# Patient Record
Sex: FEMALE | Race: WHITE | NOT HISPANIC OR LATINO | URBAN - METROPOLITAN AREA
[De-identification: names, ages, dates, MRNs, and addresses within clinical notes are randomized per-mention and may not be internally consistent; named-entity substitution may affect disease eponyms.]

---

## 2017-12-29 ENCOUNTER — IMPORTED ENCOUNTER (OUTPATIENT)
Dept: URBAN - METROPOLITAN AREA CLINIC 38 | Facility: CLINIC | Age: 67
End: 2017-12-29

## 2017-12-29 PROBLEM — H53.001 UNSPECIFIED AMBLYOPIA, RIGHT EYE: Noted: 2017-12-29

## 2017-12-29 PROBLEM — H02.403 UNSPECIFIED PTOSIS OF BILATERAL EYELIDS: Noted: 2017-12-29

## 2017-12-29 PROBLEM — H25.813 COMBINED FORMS OF AGE-RELATED CATARACT, BILATERAL: Noted: 2017-12-29

## 2017-12-29 PROCEDURE — 92014 COMPRE OPH EXAM EST PT 1/>: CPT

## 2017-12-29 PROCEDURE — 92015 DETERMINE REFRACTIVE STATE: CPT

## 2018-08-03 ENCOUNTER — IMPORTED ENCOUNTER (OUTPATIENT)
Dept: URBAN - METROPOLITAN AREA CLINIC 38 | Facility: CLINIC | Age: 68
End: 2018-08-03

## 2018-08-03 PROBLEM — H25.813 COMBINED FORMS OF AGE-RELATED CATARACT, BILATERAL: Noted: 2018-08-03

## 2018-08-03 PROBLEM — H53.001 UNSPECIFIED AMBLYOPIA, RIGHT EYE: Noted: 2018-08-03

## 2018-08-03 PROCEDURE — 92014 COMPRE OPH EXAM EST PT 1/>: CPT

## 2018-08-03 PROCEDURE — 92015 DETERMINE REFRACTIVE STATE: CPT

## 2018-09-05 ENCOUNTER — IMPORTED ENCOUNTER (OUTPATIENT)
Dept: URBAN - METROPOLITAN AREA CLINIC 38 | Facility: CLINIC | Age: 68
End: 2018-09-05

## 2018-09-05 PROBLEM — H52.02 HYPEROPIA -   LEFT EYE: Noted: 2018-09-05

## 2019-08-07 ENCOUNTER — IMPORTED ENCOUNTER (OUTPATIENT)
Dept: URBAN - METROPOLITAN AREA CLINIC 38 | Facility: CLINIC | Age: 69
End: 2019-08-07

## 2019-08-07 PROBLEM — H53.001 UNSPECIFIED AMBLYOPIA, RIGHT EYE: Noted: 2019-08-07

## 2019-08-07 PROBLEM — H25.813 COMBINED FORMS OF AGE-RELATED CATARACT, BILATERAL: Noted: 2019-08-07

## 2019-08-07 PROCEDURE — 92014 COMPRE OPH EXAM EST PT 1/>: CPT

## 2019-09-20 ENCOUNTER — IMPORTED ENCOUNTER (OUTPATIENT)
Dept: URBAN - METROPOLITAN AREA CLINIC 38 | Facility: CLINIC | Age: 69
End: 2019-09-20

## 2019-09-20 PROBLEM — H25.813 COMBINED FORMS OF AGE-RELATED CATARACT, BILATERAL: Noted: 2019-09-20

## 2019-09-20 PROCEDURE — 92012 INTRM OPH EXAM EST PATIENT: CPT

## 2019-09-20 PROCEDURE — 92136 OPHTHALMIC BIOMETRY: CPT

## 2019-11-07 ENCOUNTER — IMPORTED ENCOUNTER (OUTPATIENT)
Dept: URBAN - METROPOLITAN AREA CLINIC 38 | Facility: CLINIC | Age: 69
End: 2019-11-07

## 2019-11-07 PROCEDURE — 66984 XCAPSL CTRC RMVL W/O ECP: CPT

## 2019-11-08 ENCOUNTER — IMPORTED ENCOUNTER (OUTPATIENT)
Dept: URBAN - METROPOLITAN AREA CLINIC 38 | Facility: CLINIC | Age: 69
End: 2019-11-08

## 2019-11-18 ENCOUNTER — IMPORTED ENCOUNTER (OUTPATIENT)
Dept: URBAN - METROPOLITAN AREA CLINIC 38 | Facility: CLINIC | Age: 69
End: 2019-11-18

## 2020-03-06 ENCOUNTER — IMPORTED ENCOUNTER (OUTPATIENT)
Dept: URBAN - METROPOLITAN AREA CLINIC 38 | Facility: CLINIC | Age: 70
End: 2020-03-06

## 2020-03-06 PROBLEM — H26.492 POSTERIOR CAPSULE OPACITY -   OS: Noted: 2020-03-06

## 2020-03-06 PROCEDURE — 92012 INTRM OPH EXAM EST PATIENT: CPT

## 2021-03-12 ENCOUNTER — IMPORTED ENCOUNTER (OUTPATIENT)
Dept: URBAN - METROPOLITAN AREA CLINIC 38 | Facility: CLINIC | Age: 71
End: 2021-03-12

## 2021-03-12 PROBLEM — H26.492 POSTERIOR CAPSULE OPACITY -   OS: Noted: 2021-03-12

## 2021-03-12 PROBLEM — H25.811 COMBINED FORMS OF AGE-RELATED CATARACT, RIGHT EYE: Noted: 2021-03-12

## 2021-03-12 PROCEDURE — 92014 COMPRE OPH EXAM EST PT 1/>: CPT

## 2022-03-16 ENCOUNTER — IMPORTED ENCOUNTER (OUTPATIENT)
Dept: URBAN - METROPOLITAN AREA CLINIC 38 | Facility: CLINIC | Age: 72
End: 2022-03-16

## 2022-03-16 PROBLEM — H35.372 PUCKERING OF MACULA, LEFT EYE: Noted: 2022-03-16

## 2022-03-16 PROBLEM — H25.811 COMBINED FORMS OF AGE-RELATED CATARACT, RIGHT EYE: Noted: 2022-03-16

## 2022-03-16 PROBLEM — H26.492 POSTERIOR CAPSULE OPACITY -   OS: Noted: 2022-03-16

## 2022-03-16 PROCEDURE — 92014 COMPRE OPH EXAM EST PT 1/>: CPT

## 2022-04-12 ENCOUNTER — IMPORTED ENCOUNTER (OUTPATIENT)
Dept: URBAN - METROPOLITAN AREA CLINIC 38 | Facility: CLINIC | Age: 72
End: 2022-04-12

## 2022-04-12 PROBLEM — H53.001 UNSPECIFIED AMBLYOPIA, RIGHT EYE: Noted: 2022-04-12

## 2022-04-12 PROBLEM — H35.372 PUCKERING OF MACULA, LEFT EYE: Noted: 2022-04-12

## 2022-04-12 PROCEDURE — 92014 COMPRE OPH EXAM EST PT 1/>: CPT

## 2022-04-12 PROCEDURE — 92202 OPSCPY EXTND ON/MAC DRAW: CPT

## 2022-06-18 ASSESSMENT — KERATOMETRY
OD_AXISANGLE_DEGREES: 34
OS_K1POWER_DIOPTERS: 44.50
OD_AXISANGLE_DEGREES: 20
OS_AXISANGLE_DEGREES: 30
OS_K1POWER_DIOPTERS: 44.50
OD_K2POWER_DIOPTERS: 46.00
OD_K2POWER_DIOPTERS: 46.00
OD_K1POWER_DIOPTERS: 44.50
OS_AXISANGLE_DEGREES: 145
OS_AXISANGLE_DEGREES: 147
OD_K2POWER_DIOPTERS: 45.00
OD_AXISANGLE2_DEGREES: 100
OS_AXISANGLE2_DEGREES: 80
OS_AXISANGLE_DEGREES: 159
OS_K2POWER_DIOPTERS: 45.25
OD_K1POWER_DIOPTERS: 44.25
OD_K2POWER_DIOPTERS: 45.00
OD_AXISANGLE_DEGREES: 30
OD_K1POWER_DIOPTERS: 44.50
OS_AXISANGLE2_DEGREES: 120
OD_K2POWER_DIOPTERS: 45.00
OD_K1POWER_DIOPTERS: 44.00
OS_K2POWER_DIOPTERS: 45.50
OD_K1POWER_DIOPTERS: 44.25
OS_K1POWER_DIOPTERS: 44.75
OS_K2POWER_DIOPTERS: 45.50
OD_AXISANGLE2_DEGREES: 120
OS_AXISANGLE2_DEGREES: 57
OD_AXISANGLE_DEGREES: 25
OS_K1POWER_DIOPTERS: 44.50
OS_K1POWER_DIOPTERS: 44.75
OS_K2POWER_DIOPTERS: 45.00
OS_AXISANGLE2_DEGREES: 65
OD_AXISANGLE2_DEGREES: 115
OD_AXISANGLE2_DEGREES: 110
OS_AXISANGLE2_DEGREES: 70
OS_K1POWER_DIOPTERS: 44.75
OS_AXISANGLE_DEGREES: 160
OD_K1POWER_DIOPTERS: 44.25
OS_AXISANGLE_DEGREES: 150
OS_AXISANGLE2_DEGREES: 55
OD_AXISANGLE_DEGREES: 20
OD_K2POWER_DIOPTERS: 45.50
OD_K1POWER_DIOPTERS: 44.25
OD_AXISANGLE2_DEGREES: 110
OD_K2POWER_DIOPTERS: 45.50
OS_K2POWER_DIOPTERS: 45.00
OS_AXISANGLE_DEGREES: 160
OS_K2POWER_DIOPTERS: 45.25
OD_AXISANGLE_DEGREES: 25
OD_AXISANGLE2_DEGREES: 124
OD_AXISANGLE_DEGREES: 30
OS_K2POWER_DIOPTERS: 45.00
OS_K1POWER_DIOPTERS: 44.75
OD_AXISANGLE_DEGREES: 10
OD_K1POWER_DIOPTERS: 44.25
OS_K1POWER_DIOPTERS: 45.00
OS_AXISANGLE_DEGREES: 150
OD_AXISANGLE_DEGREES: 20
OD_K2POWER_DIOPTERS: 45.25
OD_AXISANGLE2_DEGREES: 120
OS_AXISANGLE_DEGREES: 170
OD_AXISANGLE2_DEGREES: 115
OS_K2POWER_DIOPTERS: 45.25
OS_AXISANGLE2_DEGREES: 60
OD_AXISANGLE_DEGREES: 25
OS_AXISANGLE2_DEGREES: 70
OS_K2POWER_DIOPTERS: 45.00
OD_K2POWER_DIOPTERS: 45.50
OS_K1POWER_DIOPTERS: 44.50
OS_AXISANGLE_DEGREES: 155
OS_K1POWER_DIOPTERS: 44.50
OD_K2POWER_DIOPTERS: 45.25
OD_K1POWER_DIOPTERS: 44.25
OS_AXISANGLE2_DEGREES: 60
OD_AXISANGLE2_DEGREES: 110
OS_AXISANGLE2_DEGREES: 69
OD_K1POWER_DIOPTERS: 44.00
OD_AXISANGLE2_DEGREES: 115
OS_K2POWER_DIOPTERS: 44.25

## 2022-06-18 ASSESSMENT — VISUAL ACUITY
OD_CC: <J7
OS_CC: 20/25-1
OS_CC: J2
OD_CC: <J7
OS_CC: J1
OS_BAT: 20/30-
OS_CC: J1
OS_BAT: 20/40-
OS_CC: 20/50-1
OD_CC: <J7
OS_CC: 20/30+2
OD_CC: 20/400
OD_CC: 20/400
OS_CC: 20/30+1
OD_BAT: <20/400
OD_BAT: <20/400
OS_SC: 20/25+1
OD_CC: CF5
OD_CC: 20/200-1
OD_CC: 20/400
OS_SC: 20/25-1
OD_CC: <J7
OS_BAT: 20/30-
OD_BAT: <20/400
OS_CC: J2
OD_BAT: <20/400
OS_CC: J3
OD_SC: 20/400
OS_BAT: 20/40-
OS_CC: 20/25-1
OS_CC: J1
OD_CC: <J7
OD_CC: <J7
OS_CC: J2
OD_CC: 20/400
OS_CC: 20/30-1
OD_CC: 20/70
OS_CC: 20/25-1
OS_CC: 20/25-1
OD_CC: CF5
OD_CC: <J7
OS_CC: 20/20
OS_CC: J3
OD_CC: <J7
OD_BAT: <20/400
OS_BAT: 20/30-
OD_CC: CF4
OS_BAT: <20/400

## 2022-06-18 ASSESSMENT — TONOMETRY
OD_IOP_MMHG: 9
OD_IOP_MMHG: 12
OS_IOP_MMHG: 10
OD_IOP_MMHG: 11
OD_IOP_MMHG: 12
OS_IOP_MMHG: 9
OS_IOP_MMHG: 12
OS_IOP_MMHG: 11
OD_IOP_MMHG: 10
OD_IOP_MMHG: 11
OS_IOP_MMHG: 10
OS_IOP_MMHG: 9
OD_IOP_MMHG: 12
OS_IOP_MMHG: 10
OD_IOP_MMHG: 10
OS_IOP_MMHG: 10
OS_IOP_MMHG: 8
OS_IOP_MMHG: 10

## 2022-10-12 ENCOUNTER — ESTABLISHED COMPREHENSIVE EXAM (OUTPATIENT)
Dept: URBAN - METROPOLITAN AREA CLINIC 84 | Facility: CLINIC | Age: 72
End: 2022-10-12

## 2022-10-12 DIAGNOSIS — H35.372: ICD-10-CM

## 2022-10-12 PROCEDURE — 92134 CPTRZ OPH DX IMG PST SGM RTA: CPT

## 2022-10-12 PROCEDURE — 92012 INTRM OPH EXAM EST PATIENT: CPT

## 2022-10-12 ASSESSMENT — VISUAL ACUITY
OD_GLARE: <20/400
OS_PH: 20/30
OD_CC: <J7
OD_CC: 20/400+1
OD_PH: 20/200
OS_CC: 20/40-1
OS_GLARE: 20/50

## 2022-10-12 ASSESSMENT — TONOMETRY
OS_IOP_MMHG: 9
OD_IOP_MMHG: 8

## 2022-10-20 ENCOUNTER — FOLLOW UP (OUTPATIENT)
Dept: URBAN - METROPOLITAN AREA CLINIC 68 | Facility: CLINIC | Age: 72
End: 2022-10-20

## 2022-10-20 VITALS — DIASTOLIC BLOOD PRESSURE: 63 MMHG | SYSTOLIC BLOOD PRESSURE: 115 MMHG | HEART RATE: 74 BPM

## 2022-10-20 DIAGNOSIS — H35.372: ICD-10-CM

## 2022-10-20 PROCEDURE — 92134 CPTRZ OPH DX IMG PST SGM RTA: CPT | Mod: NC

## 2022-10-20 PROCEDURE — 92250 FUNDUS PHOTOGRAPHY W/I&R: CPT

## 2022-10-20 PROCEDURE — 92014 COMPRE OPH EXAM EST PT 1/>: CPT

## 2022-10-20 ASSESSMENT — VISUAL ACUITY
OD_PH: 20/200
OS_CC: 20/30
OD_CC: 20/400

## 2022-10-20 ASSESSMENT — TONOMETRY
OD_IOP_MMHG: 10
OS_IOP_MMHG: 10

## 2023-03-01 ENCOUNTER — ESTABLISHED COMPREHENSIVE EXAM (OUTPATIENT)
Dept: URBAN - METROPOLITAN AREA CLINIC 84 | Facility: CLINIC | Age: 73
End: 2023-03-01

## 2023-03-01 DIAGNOSIS — H35.372: ICD-10-CM

## 2023-03-01 PROCEDURE — 92134 CPTRZ OPH DX IMG PST SGM RTA: CPT

## 2023-03-01 PROCEDURE — 92014 COMPRE OPH EXAM EST PT 1/>: CPT

## 2023-03-01 ASSESSMENT — TONOMETRY
OS_IOP_MMHG: 09
OD_IOP_MMHG: 09

## 2023-03-01 ASSESSMENT — VISUAL ACUITY
OS_CC: 20/30
OS_GLARE: 20/60-1
OD_CC: <J7
OD_CC: 20/400
OD_GLARE: <20/400
OS_CC: J3

## 2023-08-23 ENCOUNTER — OFFICE VISIT (OUTPATIENT)
Dept: PRIMARY CARE | Facility: CLINIC | Age: 73
End: 2023-08-23
Payer: MEDICARE

## 2023-08-23 VITALS
BODY MASS INDEX: 24.16 KG/M2 | HEART RATE: 75 BPM | TEMPERATURE: 97.8 F | DIASTOLIC BLOOD PRESSURE: 60 MMHG | OXYGEN SATURATION: 99 % | HEIGHT: 65 IN | RESPIRATION RATE: 18 BRPM | WEIGHT: 145 LBS | SYSTOLIC BLOOD PRESSURE: 110 MMHG

## 2023-08-23 DIAGNOSIS — E03.9 ACQUIRED HYPOTHYROIDISM: Primary | ICD-10-CM

## 2023-08-23 DIAGNOSIS — E78.49 OTHER HYPERLIPIDEMIA: ICD-10-CM

## 2023-08-23 DIAGNOSIS — Z12.31 ENCOUNTER FOR SCREENING MAMMOGRAM FOR MALIGNANT NEOPLASM OF BREAST: ICD-10-CM

## 2023-08-23 DIAGNOSIS — R41.89 COGNITIVE CHANGE: ICD-10-CM

## 2023-08-23 DIAGNOSIS — G62.9 NEUROPATHY: ICD-10-CM

## 2023-08-23 DIAGNOSIS — Z85.038 HISTORY OF COLON CANCER: ICD-10-CM

## 2023-08-23 DIAGNOSIS — F41.9 ANXIETY: ICD-10-CM

## 2023-08-23 PROBLEM — R41.3 MEMORY LOSS: Status: ACTIVE | Noted: 2023-08-23

## 2023-08-23 PROBLEM — Z12.39 SCREENING FOR BREAST CANCER: Status: ACTIVE | Noted: 2023-08-23

## 2023-08-23 PROCEDURE — 99204 OFFICE O/P NEW MOD 45 MIN: CPT | Performed by: NURSE PRACTITIONER

## 2023-08-23 RX ORDER — MULTIVITAMIN
1 TABLET ORAL DAILY
COMMUNITY

## 2023-08-23 RX ORDER — SERTRALINE HYDROCHLORIDE 100 MG/1
TABLET, FILM COATED ORAL
COMMUNITY
Start: 2023-05-31 | End: 2023-10-02 | Stop reason: SDUPTHER

## 2023-08-23 RX ORDER — ROSUVASTATIN CALCIUM 5 MG/1
TABLET, COATED ORAL
COMMUNITY
Start: 2023-07-25 | End: 2023-10-02 | Stop reason: SDUPTHER

## 2023-08-23 RX ORDER — ALPRAZOLAM 0.5 MG/1
0.5 TABLET ORAL NIGHTLY PRN
Qty: 30 TABLET | Refills: 1 | Status: SHIPPED | OUTPATIENT
Start: 2023-08-23 | End: 2023-10-02 | Stop reason: SDUPTHER

## 2023-08-23 RX ORDER — CYANOCOBALAMIN (VITAMIN B-12) 500 MCG
TABLET ORAL SEE ADMIN INSTRUCTIONS
COMMUNITY

## 2023-08-23 RX ORDER — LEVOTHYROXINE SODIUM 150 UG/1
TABLET ORAL
COMMUNITY
Start: 2023-05-31 | End: 2023-09-11 | Stop reason: SDUPTHER

## 2023-08-23 ASSESSMENT — PATIENT HEALTH QUESTIONNAIRE - PHQ9: SUM OF ALL RESPONSES TO PHQ9 QUESTIONS 1 & 2: 0

## 2023-08-23 NOTE — PROGRESS NOTES
"  Subjective     Patient ID: Shruti Moreno is a 73 y.o. female.    Patient is new to the practice. Here to establish care.       Review of Systems   Constitutional: Negative for chills, fatigue, fever and unexpected weight change.   HENT: Negative for congestion, sore throat and trouble swallowing.    Respiratory: Negative for cough, chest tightness, shortness of breath and wheezing.    Cardiovascular: Negative for chest pain, palpitations and leg swelling.   Gastrointestinal: Negative for abdominal pain, blood in stool, constipation, diarrhea, nausea and vomiting.   Musculoskeletal: Negative for arthralgias and myalgias.   Skin: Negative for rash.   Neurological: Negative for dizziness, weakness, light-headedness, numbness and headaches.   Psychiatric/Behavioral: The patient is nervous/anxious.         Memory issues       Objective     Vitals:    08/23/23 1057   BP: 110/60   BP Location: Left upper arm   Patient Position: Sitting   Pulse: 75   Resp: 18   Temp: 36.6 °C (97.8 °F)   SpO2: 99%   Weight: 65.8 kg (145 lb)   Height: 1.651 m (5' 5\")     Body mass index is 24.13 kg/m².    Physical Exam  Vitals reviewed.   Constitutional:       General: She is not in acute distress.     Appearance: Normal appearance. She is not ill-appearing.   HENT:      Head: Normocephalic and atraumatic.   Neck:      Vascular: No carotid bruit.   Cardiovascular:      Rate and Rhythm: Normal rate and regular rhythm.      Heart sounds: Normal heart sounds. No murmur heard.     No friction rub. No gallop.   Pulmonary:      Effort: Pulmonary effort is normal. No respiratory distress.      Breath sounds: Normal breath sounds. No stridor. No wheezing, rhonchi or rales.   Musculoskeletal:         General: No swelling.      Cervical back: Neck supple.   Lymphadenopathy:      Cervical: No cervical adenopathy.   Skin:     General: Skin is warm and dry.      Coloration: Skin is not pale.      Findings: No erythema or rash.   Neurological:      Mental " Status: She is alert.   Psychiatric:         Mood and Affect: Mood normal.         Behavior: Behavior normal.         Thought Content: Thought content normal.         Judgment: Judgment normal.         Assessment/Plan   Diagnoses and all orders for this visit:    Acquired hypothyroidism (Primary)  Assessment & Plan:  On levothyroxine but I suspect is taking it with breakfast. Advised to take first thing in the morning on an empty stomach. Suggested switching Crestor to evening dosing. Will recheck labs in about 6 weeks.     Orders:  -     CBC and Differential; Future  -     Comprehensive metabolic panel; Future  -     Lipid panel; Future  -     TSH w reflex FT4; Future    History of colon cancer  Assessment & Plan:  In her early 40s. S/p colon resection and chemotherapy; last colonoscopy around 2020.       Neuropathy  Assessment & Plan:  Likely secondary to chemotherapy.     Orders:  -     CBC and Differential; Future  -     Comprehensive metabolic panel; Future  -     Lipid panel; Future  -     TSH w reflex FT4; Future    Other hyperlipidemia  Assessment & Plan:  On statin. Recently had calcium score around 70. Lipid panel ordered.       Cognitive change  Assessment & Plan:  Likely multifactorial, ? Alzheimer's with some component of anxiety. May also want to consider repeat sleep study as she has documented history of sleep apnea, not tolerant of CPAP.     Will be seeing a different neurologist in October. Discussed medication options and limited efficacy. Was intolerant to Aricept. Reviewed recent neuropsych testing.       Anxiety  Assessment & Plan:  Will try small dose of Xanax (try 1/2 tab at bedtime). Much of her anxiety seems to be r/t frustration with her cognitive issues of late.       Encounter for screening mammogram for malignant neoplasm of breast  Assessment & Plan:  UTD.       Other orders  -     ALPRAZolam (XANAX) 0.5 mg tablet; Take 1 tablet (0.5 mg total) by mouth nightly as needed for  anxiety.

## 2023-08-23 NOTE — ASSESSMENT & PLAN NOTE
On levothyroxine but I suspect is taking it with breakfast. Advised to take first thing in the morning on an empty stomach. Suggested switching Crestor to evening dosing. Will recheck labs in about 6 weeks.

## 2023-08-23 NOTE — ASSESSMENT & PLAN NOTE
Likely multifactorial, ? Alzheimer's with some component of anxiety. May also want to consider repeat sleep study as she has documented history of sleep apnea, not tolerant of CPAP.     Will be seeing a different neurologist in October. Discussed medication options and limited efficacy. Was intolerant to Aricept. Reviewed recent neuropsych testing.

## 2023-08-28 ASSESSMENT — ENCOUNTER SYMPTOMS
LIGHT-HEADEDNESS: 0
SORE THROAT: 0
VOMITING: 0
DIZZINESS: 0
TROUBLE SWALLOWING: 0
CONSTIPATION: 0
SHORTNESS OF BREATH: 0
NERVOUS/ANXIOUS: 1
MYALGIAS: 0
HEADACHES: 0
NAUSEA: 0
PALPITATIONS: 0
NUMBNESS: 0
DIARRHEA: 0
FATIGUE: 0
CHILLS: 0
ARTHRALGIAS: 0
COUGH: 0
ABDOMINAL PAIN: 0
BLOOD IN STOOL: 0
WEAKNESS: 0
UNEXPECTED WEIGHT CHANGE: 0
CHEST TIGHTNESS: 0
WHEEZING: 0
FEVER: 0

## 2023-08-29 LAB
ALBUMIN SERPL-MCNC: 4.5 G/DL (ref 3.6–5.1)
ALBUMIN/GLOB SERPL: 1.8 (CALC) (ref 1–2.5)
ALP SERPL-CCNC: 80 U/L (ref 37–153)
ALT SERPL-CCNC: 17 U/L (ref 6–29)
AST SERPL-CCNC: 21 U/L (ref 10–35)
BASOPHILS # BLD AUTO: 38 CELLS/UL (ref 0–200)
BASOPHILS NFR BLD AUTO: 0.4 %
BILIRUB SERPL-MCNC: 0.9 MG/DL (ref 0.2–1.2)
BUN SERPL-MCNC: 31 MG/DL (ref 7–25)
BUN/CREAT SERPL: 31 (CALC) (ref 6–22)
CALCIUM SERPL-MCNC: 9.6 MG/DL (ref 8.6–10.4)
CHLORIDE SERPL-SCNC: 104 MMOL/L (ref 98–110)
CHOLEST SERPL-MCNC: 162 MG/DL
CHOLEST/HDLC SERPL: 2.9 (CALC)
CO2 SERPL-SCNC: 29 MMOL/L (ref 20–32)
CREAT SERPL-MCNC: 0.99 MG/DL (ref 0.6–1)
EGFRCR SERPLBLD CKD-EPI 2021: 59 ML/MIN/1.73M2
EOSINOPHIL # BLD AUTO: 56 CELLS/UL (ref 15–500)
EOSINOPHIL NFR BLD AUTO: 0.6 %
ERYTHROCYTE [DISTWIDTH] IN BLOOD BY AUTOMATED COUNT: 12.2 % (ref 11–15)
GLOBULIN SER CALC-MCNC: 2.5 G/DL (CALC) (ref 1.9–3.7)
GLUCOSE SERPL-MCNC: 82 MG/DL (ref 65–99)
HCT VFR BLD AUTO: 35.5 % (ref 35–45)
HDLC SERPL-MCNC: 56 MG/DL
HGB BLD-MCNC: 12 G/DL (ref 11.7–15.5)
LDLC SERPL CALC-MCNC: 87 MG/DL (CALC)
LYMPHOCYTES # BLD AUTO: 3168 CELLS/UL (ref 850–3900)
LYMPHOCYTES NFR BLD AUTO: 33.7 %
MCH RBC QN AUTO: 31.7 PG (ref 27–33)
MCHC RBC AUTO-ENTMCNC: 33.8 G/DL (ref 32–36)
MCV RBC AUTO: 93.7 FL (ref 80–100)
MONOCYTES # BLD AUTO: 658 CELLS/UL (ref 200–950)
MONOCYTES NFR BLD AUTO: 7 %
NEUTROPHILS # BLD AUTO: 5480 CELLS/UL (ref 1500–7800)
NEUTROPHILS NFR BLD AUTO: 58.3 %
NONHDLC SERPL-MCNC: 106 MG/DL (CALC)
PLATELET # BLD AUTO: 246 THOUSAND/UL (ref 140–400)
PMV BLD REES-ECKER: 11.4 FL (ref 7.5–12.5)
POTASSIUM SERPL-SCNC: 4.1 MMOL/L (ref 3.5–5.3)
PROT SERPL-MCNC: 7 G/DL (ref 6.1–8.1)
RBC # BLD AUTO: 3.79 MILLION/UL (ref 3.8–5.1)
SODIUM SERPL-SCNC: 141 MMOL/L (ref 135–146)
TRIGL SERPL-MCNC: 92 MG/DL
TSH SERPL-ACNC: 2.65 MIU/L (ref 0.4–4.5)
WBC # BLD AUTO: 9.4 THOUSAND/UL (ref 3.8–10.8)

## 2023-09-01 ENCOUNTER — TELEPHONE (OUTPATIENT)
Dept: PRIMARY CARE | Facility: CLINIC | Age: 73
End: 2023-09-01
Payer: MEDICARE

## 2023-09-11 RX ORDER — LEVOTHYROXINE SODIUM 150 UG/1
150 TABLET ORAL
Qty: 90 TABLET | Refills: 3 | Status: SHIPPED | OUTPATIENT
Start: 2023-09-11 | End: 2023-12-13 | Stop reason: SDUPTHER

## 2023-10-02 RX ORDER — SERTRALINE HYDROCHLORIDE 100 MG/1
100 TABLET, FILM COATED ORAL DAILY
Qty: 90 TABLET | Refills: 3 | Status: SHIPPED | OUTPATIENT
Start: 2023-10-02 | End: 2024-10-01

## 2023-10-02 RX ORDER — ROSUVASTATIN CALCIUM 5 MG/1
5 TABLET, COATED ORAL DAILY
Qty: 90 TABLET | Refills: 3 | Status: SHIPPED | OUTPATIENT
Start: 2023-10-02 | End: 2024-10-17

## 2023-10-02 RX ORDER — ALPRAZOLAM 0.5 MG/1
0.5 TABLET ORAL NIGHTLY PRN
Qty: 30 TABLET | Refills: 3 | Status: SHIPPED | OUTPATIENT
Start: 2023-10-02 | End: 2023-12-01

## 2023-10-03 ENCOUNTER — TELEPHONE (OUTPATIENT)
Dept: PRIMARY CARE | Facility: CLINIC | Age: 73
End: 2023-10-03
Payer: MEDICARE

## 2023-12-13 RX ORDER — LEVOTHYROXINE SODIUM 150 UG/1
150 TABLET ORAL
Qty: 90 TABLET | Refills: 0 | Status: SHIPPED | OUTPATIENT
Start: 2023-12-13 | End: 2023-12-27 | Stop reason: SDUPTHER

## 2023-12-13 NOTE — TELEPHONE ENCOUNTER
"Medicine Refill Request    Last Office Visit: 8/23/2023   Last Consult Visit: Visit date not found  Last Telemedicine Visit: Visit date not found    Next Appointment: Visit date not found      Current Outpatient Medications:   •  levothyroxine (SYNTHROID) 150 mcg tablet, Take 1 tablet (150 mcg total) by mouth daily., Disp: 90 tablet, Rfl: 3  •  melatonin tablet, Take by mouth See admin instr., Disp: , Rfl:   •  multivitamin (THERAGRAN) tablet, Take 1 tablet by mouth daily., Disp: , Rfl:   •  rosuvastatin (CRESTOR) 5 mg tablet, Take 1 tablet (5 mg total) by mouth daily., Disp: 90 tablet, Rfl: 3  •  sertraline (ZOLOFT) 100 mg tablet, Take 1 tablet (100 mg total) by mouth daily., Disp: 90 tablet, Rfl: 3      BP Readings from Last 3 Encounters:   08/23/23 110/60       Recent Lab results:  Lab Results   Component Value Date    CHOL 162 08/29/2023   ,   Lab Results   Component Value Date    HDL 56 08/29/2023   ,   Lab Results   Component Value Date    LDLCALC 87 08/29/2023   ,   Lab Results   Component Value Date    TRIG 92 08/29/2023        Lab Results   Component Value Date    GLUCOSE 82 08/29/2023   , No results found for: \"HGBA1C\"      Lab Results   Component Value Date    CREATININE 0.99 08/29/2023       Lab Results   Component Value Date    TSH 2.65 08/29/2023         No results found for: \"HGBA1C\"  "

## 2023-12-27 ENCOUNTER — OFFICE VISIT (OUTPATIENT)
Dept: PRIMARY CARE | Facility: CLINIC | Age: 73
End: 2023-12-27
Payer: MEDICARE

## 2023-12-27 VITALS
BODY MASS INDEX: 26.02 KG/M2 | HEART RATE: 65 BPM | DIASTOLIC BLOOD PRESSURE: 70 MMHG | RESPIRATION RATE: 14 BRPM | SYSTOLIC BLOOD PRESSURE: 120 MMHG | TEMPERATURE: 98.2 F | WEIGHT: 152.4 LBS | OXYGEN SATURATION: 97 % | HEIGHT: 64 IN

## 2023-12-27 DIAGNOSIS — F41.9 ANXIETY: ICD-10-CM

## 2023-12-27 DIAGNOSIS — Z00.00 MEDICARE ANNUAL WELLNESS VISIT, SUBSEQUENT: Primary | ICD-10-CM

## 2023-12-27 DIAGNOSIS — E03.9 ACQUIRED HYPOTHYROIDISM: ICD-10-CM

## 2023-12-27 DIAGNOSIS — E78.49 OTHER HYPERLIPIDEMIA: ICD-10-CM

## 2023-12-27 DIAGNOSIS — G31.84 MILD COGNITIVE IMPAIRMENT: ICD-10-CM

## 2023-12-27 DIAGNOSIS — Z23 NEED FOR COVID-19 VACCINE: ICD-10-CM

## 2023-12-27 PROCEDURE — 91322 SARSCOV2 VAC 50 MCG/0.5ML IM: CPT | Performed by: INTERNAL MEDICINE

## 2023-12-27 PROCEDURE — 90480 ADMN SARSCOV2 VAC 1/ONLY CMP: CPT | Performed by: INTERNAL MEDICINE

## 2023-12-27 PROCEDURE — G0439 PPPS, SUBSEQ VISIT: HCPCS | Performed by: INTERNAL MEDICINE

## 2023-12-27 RX ORDER — ALPRAZOLAM 0.25 MG/1
0.25 TABLET ORAL NIGHTLY PRN
COMMUNITY

## 2023-12-27 RX ORDER — LEVOTHYROXINE SODIUM 150 UG/1
150 TABLET ORAL
Qty: 90 TABLET | Refills: 1 | Status: SHIPPED | OUTPATIENT
Start: 2023-12-27 | End: 2024-03-11

## 2023-12-27 ASSESSMENT — ENCOUNTER SYMPTOMS
DYSURIA: 0
SORE THROAT: 0
EYE PAIN: 0
ABDOMINAL PAIN: 0
SHORTNESS OF BREATH: 0
CHILLS: 0
DIZZINESS: 0
FEVER: 0
ARTHRALGIAS: 0

## 2023-12-27 ASSESSMENT — MINI COG
TOTAL SCORE: 4
COMPLETED: YES

## 2023-12-27 NOTE — PROGRESS NOTES
Subjective      Patient ID: Shruti Moreno is a 73 y.o. female.    HPI    New patient to me.  Previously seen by Gabriela Herrera.  Currently lives in [place].  Pt with memory loss -- mild cognitive impairment.  Seen by and currently follows Neurology in [place].  Had neuropsych test which showed mild memory loss.  Mostly short term. Pt does not drive anymore.      Patient with history of hyperlipidemia on rosuvastatin.  Anxiety on sertraline.  Hypothyroidism on Synthroid.  Would like to stop the zoloft in the near future.      Wt Readings from Last 3 Encounters:   12/27/23 69.1 kg (152 lb 6.4 oz)   08/23/23 65.8 kg (145 lb)     BP Readings from Last 3 Encounters:   12/27/23 120/70   08/23/23 110/60     Colonoscopy-- 7/2/2020. Repeat 3-5 years. S/p 40% colectomy for colon cancer..      Follows GYN in [place].  Uniplaces.  Dr. Lori Laboy.  Pessary check every 3 months.      Mammogram--3/6/2023.  [facility]aces in [facility].    Bone density scan-- 12/22/2021.  Osteopenia.    Eye exam -- regularly.     Dentist -- regularly.      Dermatology -- Feb 2023.         The following have been reviewed and updated as appropriate in this visit:   Tobacco  Allergies  Meds  Problems  Med Hx  Surg Hx  Fam Hx  Soc   Hx      Patient Active Problem List   Diagnosis   • Hypothyroidism   • History of colon cancer   • Other hyperlipidemia   • Neuropathy   • Mild cognitive impairment   • Anxiety   • Screening for breast cancer     History reviewed. No pertinent surgical history.  Family History   Problem Relation Age of Onset   • Colon cancer Biological Mother    • Cancer Biological Father    • Skin cancer Biological Sister    • Lung cancer Biological Brother    • Lung cancer Biological Brother      Social History     Socioeconomic History   • Marital status:      Spouse name: Not on file   • Number of children: Not on file   • Years of education: Not on file   • Highest education level: Not on file   Occupational  History   • Occupation: Retired dental    Tobacco Use   • Smoking status: Never   • Smokeless tobacco: Never   Substance and Sexual Activity   • Alcohol use: Never   • Drug use: Never   • Sexual activity: Not on file   Other Topics Concern   • Not on file   Social History Narrative   • Not on file     Social Determinants of Health     Financial Resource Strain: Not on file   Food Insecurity: Not on file   Transportation Needs: Not on file   Physical Activity: Not on file   Stress: Not on file   Social Connections: Not on file   Intimate Partner Violence: Not on file   Housing Stability: Not on file       Review of Systems   Constitutional: Negative for chills and fever.   HENT: Negative for sore throat.    Eyes: Negative for pain.   Respiratory: Negative for shortness of breath.    Cardiovascular: Negative for chest pain.   Gastrointestinal: Negative for abdominal pain.   Genitourinary: Negative for dysuria.   Musculoskeletal: Negative for arthralgias.   Skin: Negative for rash.   Neurological: Negative for dizziness.       Allergies   Allergen Reactions   • Poison Ivy Extract      Other reaction(s): Not available   • Morphine      Other reaction(s): fainted, Not available, Other (see comments)  fainted  Other reaction(s): fainted  Morphine       Current Outpatient Medications   Medication Sig Dispense Refill   • ALPRAZolam (XANAX) 0.25 mg tablet Take 0.25 mg by mouth nightly as needed for anxiety.     • levothyroxine (SYNTHROID) 150 mcg tablet Take 1 tablet (150 mcg total) by mouth daily. 90 tablet 1   • melatonin tablet Take by mouth See admin instr.     • multivitamin (THERAGRAN) tablet Take 1 tablet by mouth daily.     • rosuvastatin (CRESTOR) 5 mg tablet Take 1 tablet (5 mg total) by mouth daily. 90 tablet 3   • sertraline (ZOLOFT) 100 mg tablet Take 1 tablet (100 mg total) by mouth daily. 90 tablet 3     No current facility-administered medications for this visit.       Objective   Vitals:     "12/27/23 1501   BP: 120/70   Pulse: 65   Resp: 14   Temp: 36.8 °C (98.2 °F)   TempSrc: Temporal   SpO2: 97%   Weight: 69.1 kg (152 lb 6.4 oz)   Height: 1.626 m (5' 4\")       Physical Exam  Vitals and nursing note reviewed.   Constitutional:       Appearance: She is well-developed.   HENT:      Head: Normocephalic and atraumatic.      Nose: Nose normal.   Eyes:      Conjunctiva/sclera: Conjunctivae normal.   Cardiovascular:      Rate and Rhythm: Normal rate and regular rhythm.   Pulmonary:      Effort: Pulmonary effort is normal.      Breath sounds: Normal breath sounds.   Musculoskeletal:         General: Normal range of motion.      Cervical back: Normal range of motion.   Skin:     General: Skin is warm and dry.   Neurological:      Mental Status: She is alert and oriented to person, place, and time.   Psychiatric:         Judgment: Judgment normal.         Assessment/Plan   Diagnoses and all orders for this visit:    Medicare annual wellness visit, subsequent (Primary)    Acquired hypothyroidism  -     levothyroxine (SYNTHROID) 150 mcg tablet; Take 1 tablet (150 mcg total) by mouth daily.    Other hyperlipidemia    Anxiety    Mild cognitive impairment    Need for COVID-19 vaccine  -     Covid-19 Moderna Monovalent Fall 2023       Labs reviewed with the patient.    Advise regular activity and healthy diet.    COVID booster today.    We did discuss other vaccines such as RSV and Tdap in the future.      Sincere Romero MD    12/27/2023  Rozina Moreno is a 73 y.o. female who presents for a subsequent annual wellness visit.     Patient Care Team:  Sincere Romero MD as PCP - General (Internal Medicine)    Comprehensive Medical and Social History  Patient Active Problem List   Diagnosis   • Hypothyroidism   • History of colon cancer   • Other hyperlipidemia   • Neuropathy   • Mild cognitive impairment   • Anxiety   • Screening for breast cancer     Past Medical History:   Diagnosis Date   • Anxiety    • Colon " "cancer (CMS/HCC)    • High cholesterol      History reviewed. No pertinent surgical history.  Allergies   Allergen Reactions   • Poison Ivy Extract      Other reaction(s): Not available   • Morphine      Other reaction(s): fainted, Not available, Other (see comments)  fainted  Other reaction(s): fainted  Morphine       Current Outpatient Medications   Medication Sig Dispense Refill   • ALPRAZolam (XANAX) 0.25 mg tablet Take 0.25 mg by mouth nightly as needed for anxiety.     • levothyroxine (SYNTHROID) 150 mcg tablet Take 1 tablet (150 mcg total) by mouth daily. 90 tablet 1   • melatonin tablet Take by mouth See admin instr.     • multivitamin (THERAGRAN) tablet Take 1 tablet by mouth daily.     • rosuvastatin (CRESTOR) 5 mg tablet Take 1 tablet (5 mg total) by mouth daily. 90 tablet 3   • sertraline (ZOLOFT) 100 mg tablet Take 1 tablet (100 mg total) by mouth daily. 90 tablet 3     No current facility-administered medications for this visit.     Social History     Tobacco Use   • Smoking status: Never   • Smokeless tobacco: Never   Substance Use Topics   • Alcohol use: Never   • Drug use: Never     Family History   Problem Relation Age of Onset   • Colon cancer Biological Mother    • Cancer Biological Father    • Skin cancer Biological Sister    • Lung cancer Biological Brother    • Lung cancer Biological Brother        Objective   Vitals  Vitals:    12/27/23 1501   BP: 120/70   Pulse: 65   Resp: 14   Temp: 36.8 °C (98.2 °F)   TempSrc: Temporal   SpO2: 97%   Weight: 69.1 kg (152 lb 6.4 oz)   Height: 1.626 m (5' 4\")     Body mass index is 26.16 kg/m².    Advanced Care Plan  Does patient have advance directive?: Yes                                     PHQ                                                            Mini Cog  Completed: Yes  Score: 4  Result: Negative      Get Up and Go  Result: Pass    STEADI Falls Risk  One or more falls in the last year: Yes   How many times: 2 or more       Has trouble stepping up " onto a curb: No   Advised to use a cane or walker to get around safely: No   Often has to rush to the toilet: No   Feels unsteady when walking: No   Has lost some feeling in feet: No   Often feels sad or depressed: No   Steadies self on furniture while walking at home: No   Takes medication that makes him/her feel lightheaded or more tired than usual: No   Worried about falling: Yes   Takes medicine to sleep or improve mood: No   Needs to push with hands when rising from a chair: No   Falls screen completed: Yes     Hearing and Vision Screening  No results found.  See HRA for relevant hearing screening response.    Diet and Exercise            Assessment/Plan   Diagnoses and all orders for this visit:    Medicare annual wellness visit, subsequent (Primary)    Acquired hypothyroidism  -     levothyroxine (SYNTHROID) 150 mcg tablet; Take 1 tablet (150 mcg total) by mouth daily.    Other hyperlipidemia    Anxiety    Mild cognitive impairment    Need for COVID-19 vaccine  -     Covid-19 Moderna Monovalent Fall 2023        See Patient Instructions (the written plan) which was given to the patient for PPPS and health risk factors with interventions.

## 2024-03-10 DIAGNOSIS — E03.9 ACQUIRED HYPOTHYROIDISM: ICD-10-CM

## 2024-03-11 RX ORDER — LEVOTHYROXINE SODIUM 150 UG/1
150 TABLET ORAL DAILY
Qty: 90 TABLET | Refills: 1 | Status: SHIPPED | OUTPATIENT
Start: 2024-03-11

## 2024-03-11 NOTE — TELEPHONE ENCOUNTER
"Medicine Refill Request  New Pharmacy     Last Office Visit: 12/27/2023   Last Consult Visit: Visit date not found  Last Telemedicine Visit: Visit date not found    Next Appointment: 5/8/2024      Current Outpatient Medications:   •  ALPRAZolam (XANAX) 0.25 mg tablet, Take 0.25 mg by mouth nightly as needed for anxiety., Disp: , Rfl:   •  levothyroxine (SYNTHROID) 150 mcg tablet, Take 1 tablet (150 mcg total) by mouth daily., Disp: 90 tablet, Rfl: 1  •  melatonin tablet, Take by mouth See admin instr., Disp: , Rfl:   •  multivitamin (THERAGRAN) tablet, Take 1 tablet by mouth daily., Disp: , Rfl:   •  rosuvastatin (CRESTOR) 5 mg tablet, Take 1 tablet (5 mg total) by mouth daily., Disp: 90 tablet, Rfl: 3  •  sertraline (ZOLOFT) 100 mg tablet, Take 1 tablet (100 mg total) by mouth daily., Disp: 90 tablet, Rfl: 3      BP Readings from Last 3 Encounters:   12/27/23 120/70   08/23/23 110/60       Recent Lab results:  Lab Results   Component Value Date    CHOL 162 08/29/2023   ,   Lab Results   Component Value Date    HDL 56 08/29/2023   ,   Lab Results   Component Value Date    LDLCALC 87 08/29/2023   ,   Lab Results   Component Value Date    TRIG 92 08/29/2023        Lab Results   Component Value Date    GLUCOSE 82 08/29/2023   , No results found for: \"HGBA1C\"      Lab Results   Component Value Date    CREATININE 0.99 08/29/2023       Lab Results   Component Value Date    TSH 2.65 08/29/2023         No results found for: \"HGBA1C\"  "

## 2024-03-15 ENCOUNTER — ESTABLISHED COMPREHENSIVE EXAM (OUTPATIENT)
Dept: URBAN - METROPOLITAN AREA CLINIC 84 | Facility: CLINIC | Age: 74
End: 2024-03-15

## 2024-03-15 DIAGNOSIS — H35.372: ICD-10-CM

## 2024-03-15 PROCEDURE — 92014 COMPRE OPH EXAM EST PT 1/>: CPT

## 2024-03-15 PROCEDURE — 92015 DETERMINE REFRACTIVE STATE: CPT

## 2024-03-15 PROCEDURE — 92134 CPTRZ OPH DX IMG PST SGM RTA: CPT

## 2024-03-15 ASSESSMENT — VISUAL ACUITY
OS_GLARE: 20/60+2
OD_GLARE: 20/400
OD_CC: <J7
OD_CC: 20/400+1
OS_CC: J4
OS_CC: 20/30

## 2024-03-15 ASSESSMENT — TONOMETRY
OS_IOP_MMHG: 10
OD_IOP_MMHG: 10

## 2024-08-15 DIAGNOSIS — Z13.220 ENCOUNTER FOR LIPID SCREENING FOR CARDIOVASCULAR DISEASE: ICD-10-CM

## 2024-08-15 DIAGNOSIS — Z00.00 MEDICARE ANNUAL WELLNESS VISIT, SUBSEQUENT: ICD-10-CM

## 2024-08-15 DIAGNOSIS — Z13.6 ENCOUNTER FOR LIPID SCREENING FOR CARDIOVASCULAR DISEASE: ICD-10-CM

## 2024-08-15 DIAGNOSIS — E03.9 ACQUIRED HYPOTHYROIDISM: Primary | ICD-10-CM

## 2024-08-16 LAB
ALBUMIN SERPL-MCNC: 4.5 G/DL (ref 3.8–4.8)
ALP SERPL-CCNC: 99 IU/L (ref 44–121)
ALT SERPL-CCNC: 19 IU/L (ref 0–32)
AST SERPL-CCNC: 25 IU/L (ref 0–40)
BASOPHILS # BLD AUTO: 0.1 X10E3/UL (ref 0–0.2)
BASOPHILS # BLD AUTO: 0.1 X10E3/UL (ref 0–0.2)
BASOPHILS NFR BLD AUTO: 1 %
BASOPHILS NFR BLD AUTO: 1 %
BILIRUB SERPL-MCNC: 1.1 MG/DL (ref 0–1.2)
BUN SERPL-MCNC: 27 MG/DL (ref 8–27)
BUN/CREAT SERPL: 30 (ref 12–28)
CALCIUM SERPL-MCNC: 9.7 MG/DL (ref 8.7–10.3)
CHLORIDE SERPL-SCNC: 101 MMOL/L (ref 96–106)
CHOLEST SERPL-MCNC: 175 MG/DL (ref 100–199)
CO2 SERPL-SCNC: 25 MMOL/L (ref 20–29)
CREAT SERPL-MCNC: 0.91 MG/DL (ref 0.57–1)
EGFRCR SERPLBLD CKD-EPI 2021: 65 ML/MIN/1.73
EOSINOPHIL # BLD AUTO: 0.1 X10E3/UL (ref 0–0.4)
EOSINOPHIL # BLD AUTO: 0.1 X10E3/UL (ref 0–0.4)
EOSINOPHIL NFR BLD AUTO: 1 %
EOSINOPHIL NFR BLD AUTO: 1 %
ERYTHROCYTE [DISTWIDTH] IN BLOOD BY AUTOMATED COUNT: 12.5 % (ref 11.7–15.4)
ERYTHROCYTE [DISTWIDTH] IN BLOOD BY AUTOMATED COUNT: 12.5 % (ref 11.7–15.4)
GLOBULIN SER CALC-MCNC: 2.7 G/DL (ref 1.5–4.5)
GLUCOSE SERPL-MCNC: 81 MG/DL (ref 70–99)
HCT VFR BLD AUTO: 39.6 % (ref 34–46.6)
HCT VFR BLD AUTO: 39.9 % (ref 34–46.6)
HDLC SERPL-MCNC: 71 MG/DL
HGB BLD-MCNC: 12.9 G/DL (ref 11.1–15.9)
HGB BLD-MCNC: 13.2 G/DL (ref 11.1–15.9)
IMM GRANULOCYTES # BLD AUTO: 0 X10E3/UL (ref 0–0.1)
IMM GRANULOCYTES # BLD AUTO: 0 X10E3/UL (ref 0–0.1)
IMM GRANULOCYTES NFR BLD AUTO: 0 %
IMM GRANULOCYTES NFR BLD AUTO: 0 %
LDLC SERPL CALC-MCNC: 83 MG/DL (ref 0–99)
LYMPHOCYTES # BLD AUTO: 4 X10E3/UL (ref 0.7–3.1)
LYMPHOCYTES # BLD AUTO: 4.2 X10E3/UL (ref 0.7–3.1)
LYMPHOCYTES NFR BLD AUTO: 35 %
LYMPHOCYTES NFR BLD AUTO: 37 %
MCH RBC QN AUTO: 31.4 PG (ref 26.6–33)
MCH RBC QN AUTO: 31.9 PG (ref 26.6–33)
MCHC RBC AUTO-ENTMCNC: 32.6 G/DL (ref 31.5–35.7)
MCHC RBC AUTO-ENTMCNC: 33.1 G/DL (ref 31.5–35.7)
MCV RBC AUTO: 96 FL (ref 79–97)
MCV RBC AUTO: 96 FL (ref 79–97)
MONOCYTES # BLD AUTO: 0.6 X10E3/UL (ref 0.1–0.9)
MONOCYTES # BLD AUTO: 0.6 X10E3/UL (ref 0.1–0.9)
MONOCYTES NFR BLD AUTO: 5 %
MONOCYTES NFR BLD AUTO: 5 %
NEUTROPHILS # BLD AUTO: 6.4 X10E3/UL (ref 1.4–7)
NEUTROPHILS # BLD AUTO: 6.5 X10E3/UL (ref 1.4–7)
NEUTROPHILS NFR BLD AUTO: 56 %
NEUTROPHILS NFR BLD AUTO: 58 %
PLATELET # BLD AUTO: 279 X10E3/UL (ref 150–450)
PLATELET # BLD AUTO: 288 X10E3/UL (ref 150–450)
POTASSIUM SERPL-SCNC: 4.1 MMOL/L (ref 3.5–5.2)
PROT SERPL-MCNC: 7.2 G/DL (ref 6–8.5)
RBC # BLD AUTO: 4.11 X10E6/UL (ref 3.77–5.28)
RBC # BLD AUTO: 4.14 X10E6/UL (ref 3.77–5.28)
SODIUM SERPL-SCNC: 141 MMOL/L (ref 134–144)
T4 FREE SERPL-MCNC: 1.56 NG/DL (ref 0.82–1.77)
TRIGL SERPL-MCNC: 121 MG/DL (ref 0–149)
TSH SERPL DL<=0.005 MIU/L-ACNC: 3.38 UIU/ML (ref 0.45–4.5)
VLDLC SERPL CALC-MCNC: 21 MG/DL (ref 5–40)
WBC # BLD AUTO: 11.3 X10E3/UL (ref 3.4–10.8)
WBC # BLD AUTO: 11.4 X10E3/UL (ref 3.4–10.8)

## 2024-10-17 RX ORDER — ROSUVASTATIN CALCIUM 5 MG/1
5 TABLET, COATED ORAL DAILY
Qty: 90 TABLET | Refills: 0 | Status: SHIPPED | OUTPATIENT
Start: 2024-10-17 | End: 2024-10-18 | Stop reason: SDUPTHER

## 2024-10-17 NOTE — TELEPHONE ENCOUNTER
"Medicine Refill Request    Last Office Visit: 12/27/2023   Last Consult Visit: Visit date not found  Last Telemedicine Visit: Visit date not found    Next Appointment: Visit date not found      Current Outpatient Medications:     levothyroxine (SYNTHROID) 150 mcg tablet, TAKE ONE TABLET BY MOUTH EVERY DAY, Disp: 90 tablet, Rfl: 1    ALPRAZolam (XANAX) 0.25 mg tablet, Take 0.25 mg by mouth nightly as needed for anxiety., Disp: , Rfl:     melatonin tablet, Take by mouth See admin instr., Disp: , Rfl:     multivitamin (THERAGRAN) tablet, Take 1 tablet by mouth daily., Disp: , Rfl:     rosuvastatin (CRESTOR) 5 mg tablet, Take 1 tablet (5 mg total) by mouth daily., Disp: 90 tablet, Rfl: 3    sertraline (ZOLOFT) 100 mg tablet, Take 1 tablet (100 mg total) by mouth daily., Disp: 90 tablet, Rfl: 3      BP Readings from Last 3 Encounters:   12/27/23 120/70   08/23/23 110/60       Recent Lab results:  Lab Results   Component Value Date    CHOL 175 08/15/2024   ,   Lab Results   Component Value Date    HDL 71 08/15/2024   ,   Lab Results   Component Value Date    LDLCALC 83 08/15/2024   ,   Lab Results   Component Value Date    TRIG 121 08/15/2024        Lab Results   Component Value Date    GLUCOSE 81 08/15/2024   , No results found for: \"HGBA1C\"      Lab Results   Component Value Date    CREATININE 0.91 08/15/2024       Lab Results   Component Value Date    TSH 3.380 08/15/2024         No results found for: \"HGBA1C\"  "

## 2024-10-18 RX ORDER — ROSUVASTATIN CALCIUM 5 MG/1
5 TABLET, COATED ORAL DAILY
Qty: 90 TABLET | Refills: 0 | Status: SHIPPED | OUTPATIENT
Start: 2024-10-18

## 2024-10-18 NOTE — TELEPHONE ENCOUNTER
"Pt is in need of a refill     Medicine Refill Request    Last Office Visit: 12/27/2023   Last Consult Visit: Visit date not found  Last Telemedicine Visit: Visit date not found    Next Appointment: Visit date not found      Current Outpatient Medications:     levothyroxine (SYNTHROID) 150 mcg tablet, TAKE ONE TABLET BY MOUTH EVERY DAY, Disp: 90 tablet, Rfl: 1    ALPRAZolam (XANAX) 0.25 mg tablet, Take 0.25 mg by mouth nightly as needed for anxiety., Disp: , Rfl:     melatonin tablet, Take by mouth See admin instr., Disp: , Rfl:     multivitamin (THERAGRAN) tablet, Take 1 tablet by mouth daily., Disp: , Rfl:     rosuvastatin (CRESTOR) 5 mg tablet, TAKE ONE TABLET BY MOUTH ONCE DAILY, Disp: 90 tablet, Rfl: 0    sertraline (ZOLOFT) 100 mg tablet, Take 1 tablet (100 mg total) by mouth daily., Disp: 90 tablet, Rfl: 3      BP Readings from Last 3 Encounters:   12/27/23 120/70   08/23/23 110/60       Recent Lab results:  Lab Results   Component Value Date    CHOL 175 08/15/2024   ,   Lab Results   Component Value Date    HDL 71 08/15/2024   ,   Lab Results   Component Value Date    LDLCALC 83 08/15/2024   ,   Lab Results   Component Value Date    TRIG 121 08/15/2024        Lab Results   Component Value Date    GLUCOSE 81 08/15/2024   , No results found for: \"HGBA1C\"      Lab Results   Component Value Date    CREATININE 0.91 08/15/2024       Lab Results   Component Value Date    TSH 3.380 08/15/2024         No results found for: \"HGBA1C\"   "